# Patient Record
Sex: FEMALE | Race: WHITE | RURAL
[De-identification: names, ages, dates, MRNs, and addresses within clinical notes are randomized per-mention and may not be internally consistent; named-entity substitution may affect disease eponyms.]

---

## 2022-01-26 ENCOUNTER — HOSPITAL ENCOUNTER (OUTPATIENT)
Facility: HOSPITAL | Age: 22
Discharge: HOME | End: 2022-01-26
Payer: MEDICAID

## 2022-01-26 LAB — SARS-COV-2, NAAT: DETECTED

## 2022-01-26 PROCEDURE — 87635 SARS-COV-2 COVID-19 AMP PRB: CPT

## 2022-04-12 ENCOUNTER — HOSPITAL ENCOUNTER (OUTPATIENT)
Facility: HOSPITAL | Age: 22
Discharge: HOME | End: 2022-04-12
Payer: MEDICAID

## 2022-04-12 LAB
RAPID INFLUENZA  B AGN: NEGATIVE
RAPID INFLUENZA A AGN: NEGATIVE

## 2022-04-12 PROCEDURE — 87804 INFLUENZA ASSAY W/OPTIC: CPT

## 2022-07-14 ENCOUNTER — HOSPITAL ENCOUNTER (OUTPATIENT)
Facility: HOSPITAL | Age: 22
Discharge: HOME | End: 2022-07-14
Payer: MEDICAID

## 2022-07-14 LAB
A/G RATIO: 2.2 (ref 0.8–2)
ALBUMIN SERPL-MCNC: 5.3 G/DL (ref 3.4–4.8)
ALP BLD-CCNC: 72 U/L (ref 25–100)
ALT SERPL-CCNC: 10 U/L (ref 4–36)
ANION GAP SERPL CALCULATED.3IONS-SCNC: 11 MMOL/L (ref 3–16)
AST SERPL-CCNC: 19 U/L (ref 8–33)
BASOPHILS ABSOLUTE: 0 K/UL (ref 0–0.1)
BASOPHILS RELATIVE PERCENT: 0.3 %
BILIRUB SERPL-MCNC: 1.4 MG/DL (ref 0.3–1.2)
BUN BLDV-MCNC: 11 MG/DL (ref 6–20)
CALCIUM SERPL-MCNC: 10.5 MG/DL (ref 8.5–10.5)
CHLORIDE BLD-SCNC: 104 MMOL/L (ref 98–107)
CHOLESTEROL, TOTAL: 195 MG/DL (ref 0–200)
CO2: 26 MMOL/L (ref 20–30)
CREAT SERPL-MCNC: 0.8 MG/DL (ref 0.4–1.2)
EOSINOPHILS ABSOLUTE: 0.1 K/UL (ref 0–0.4)
EOSINOPHILS RELATIVE PERCENT: 1.6 %
FOLATE: 13.96 NG/ML
GFR AFRICAN AMERICAN: >59
GFR NON-AFRICAN AMERICAN: >60
GLOBULIN: 2.4 G/DL
GLUCOSE BLD-MCNC: 92 MG/DL (ref 74–106)
HCT VFR BLD CALC: 42.4 % (ref 37–47)
HDLC SERPL-MCNC: 55 MG/DL (ref 40–60)
HEMOGLOBIN: 13.9 G/DL (ref 11.5–16.5)
IMMATURE GRANULOCYTES #: 0 K/UL
IMMATURE GRANULOCYTES %: 0.3 % (ref 0–5)
LDL CHOLESTEROL CALCULATED: 129 MG/DL
LYMPHOCYTES ABSOLUTE: 1.9 K/UL (ref 1.5–4)
LYMPHOCYTES RELATIVE PERCENT: 31.1 %
MCH RBC QN AUTO: 27.9 PG (ref 27–32)
MCHC RBC AUTO-ENTMCNC: 32.8 G/DL (ref 31–35)
MCV RBC AUTO: 85 FL (ref 80–100)
MONOCYTES ABSOLUTE: 0.5 K/UL (ref 0.2–0.8)
MONOCYTES RELATIVE PERCENT: 8 %
NEUTROPHILS ABSOLUTE: 3.6 K/UL (ref 2–7.5)
NEUTROPHILS RELATIVE PERCENT: 58.7 %
PDW BLD-RTO: 13.2 % (ref 11–16)
PLATELET # BLD: 301 K/UL (ref 150–400)
PMV BLD AUTO: 9.9 FL (ref 6–10)
POTASSIUM SERPL-SCNC: 4.7 MMOL/L (ref 3.4–5.1)
RBC # BLD: 4.99 M/UL (ref 3.8–5.8)
SODIUM BLD-SCNC: 141 MMOL/L (ref 136–145)
TOTAL PROTEIN: 7.7 G/DL (ref 6.4–8.3)
TRIGL SERPL-MCNC: 54 MG/DL (ref 0–249)
TSH SERPL DL<=0.05 MIU/L-ACNC: 1.68 UIU/ML (ref 0.27–4.2)
VITAMIN B-12: 437 PG/ML (ref 211–911)
VLDLC SERPL CALC-MCNC: 11 MG/DL
WBC # BLD: 6.2 K/UL (ref 4–11)

## 2022-07-14 PROCEDURE — 80061 LIPID PANEL: CPT

## 2022-07-14 PROCEDURE — 85025 COMPLETE CBC W/AUTO DIFF WBC: CPT

## 2022-07-14 PROCEDURE — 84443 ASSAY THYROID STIM HORMONE: CPT

## 2022-07-14 PROCEDURE — 82746 ASSAY OF FOLIC ACID SERUM: CPT

## 2022-07-14 PROCEDURE — 36415 COLL VENOUS BLD VENIPUNCTURE: CPT

## 2022-07-14 PROCEDURE — 82607 VITAMIN B-12: CPT

## 2022-07-14 PROCEDURE — 80053 COMPREHEN METABOLIC PANEL: CPT

## 2022-11-02 ENCOUNTER — HOSPITAL ENCOUNTER (OUTPATIENT)
Facility: HOSPITAL | Age: 22
Discharge: HOME | End: 2022-11-02
Payer: MEDICAID

## 2022-11-02 ENCOUNTER — HOSPITAL ENCOUNTER (OUTPATIENT)
Dept: GENERAL RADIOLOGY | Facility: HOSPITAL | Age: 22
Discharge: HOME | End: 2022-11-02
Payer: MEDICAID

## 2022-11-02 DIAGNOSIS — Z11.1 SCREENING FOR TUBERCULOSIS: ICD-10-CM

## 2022-11-02 DIAGNOSIS — R61 GENERALIZED HYPERHIDROSIS: ICD-10-CM

## 2022-11-02 LAB
A/G RATIO: 2.6 (ref 0.8–2)
ALBUMIN SERPL-MCNC: 5.2 G/DL (ref 3.4–4.8)
ALP BLD-CCNC: 58 U/L (ref 25–100)
ALT SERPL-CCNC: 9 U/L (ref 4–36)
ANION GAP SERPL CALCULATED.3IONS-SCNC: 11 MMOL/L (ref 3–16)
AST SERPL-CCNC: 16 U/L (ref 8–33)
BASOPHILS ABSOLUTE: 0 K/UL (ref 0–0.1)
BASOPHILS RELATIVE PERCENT: 0.4 %
BILIRUB SERPL-MCNC: 1.6 MG/DL (ref 0.3–1.2)
BUN BLDV-MCNC: 7 MG/DL (ref 6–20)
CALCIUM SERPL-MCNC: 9.6 MG/DL (ref 8.5–10.5)
CHLORIDE BLD-SCNC: 104 MMOL/L (ref 98–107)
CHOLESTEROL, TOTAL: 173 MG/DL (ref 0–200)
CO2: 27 MMOL/L (ref 20–30)
CREAT SERPL-MCNC: 0.7 MG/DL (ref 0.4–1.2)
EOSINOPHILS ABSOLUTE: 0.1 K/UL (ref 0–0.4)
EOSINOPHILS RELATIVE PERCENT: 0.9 %
FERRITIN: 31.9 NG/ML (ref 22–322)
FOLATE: 13.21 NG/ML
FOLLICLE STIMULATING HORMONE: 9.3 MIU/ML
GFR SERPL CREATININE-BSD FRML MDRD: >60 ML/MIN/{1.73_M2}
GLOBULIN: 2 G/DL
GLUCOSE BLD-MCNC: 113 MG/DL (ref 74–106)
HBA1C MFR BLD: 4.9 %
HCT VFR BLD CALC: 41.2 % (ref 37–47)
HDLC SERPL-MCNC: 47 MG/DL (ref 40–60)
HEMOGLOBIN: 13.8 G/DL (ref 11.5–16.5)
IMMATURE GRANULOCYTES #: 0 K/UL
IMMATURE GRANULOCYTES %: 0.4 % (ref 0–5)
IRON: 84 UG/DL (ref 37–145)
LDL CHOLESTEROL CALCULATED: 118 MG/DL
LUTEINIZING HORMONE: 17.6 MIU/ML
LYMPHOCYTES ABSOLUTE: 1.3 K/UL (ref 1.5–4)
LYMPHOCYTES RELATIVE PERCENT: 24.5 %
MAGNESIUM: 2.1 MG/DL (ref 1.7–2.4)
MCH RBC QN AUTO: 28.8 PG (ref 27–32)
MCHC RBC AUTO-ENTMCNC: 33.5 G/DL (ref 31–35)
MCV RBC AUTO: 86 FL (ref 80–100)
MONO TEST: NEGATIVE
MONOCYTES ABSOLUTE: 0.4 K/UL (ref 0.2–0.8)
MONOCYTES RELATIVE PERCENT: 6.6 %
NEUTROPHILS ABSOLUTE: 3.6 K/UL (ref 2–7.5)
NEUTROPHILS RELATIVE PERCENT: 67.2 %
PDW BLD-RTO: 13.1 % (ref 11–16)
PLATELET # BLD: 272 K/UL (ref 150–400)
PMV BLD AUTO: 9.7 FL (ref 6–10)
POTASSIUM SERPL-SCNC: 3.7 MMOL/L (ref 3.4–5.1)
RBC # BLD: 4.79 M/UL (ref 3.8–5.8)
SEDIMENTATION RATE, ERYTHROCYTE: 4 MM/HR (ref 0–20)
SODIUM BLD-SCNC: 142 MMOL/L (ref 136–145)
TOTAL IRON BINDING CAPACITY: 347 UG/DL (ref 250–450)
TOTAL PROTEIN: 7.2 G/DL (ref 6.4–8.3)
TRIGL SERPL-MCNC: 41 MG/DL (ref 0–249)
TSH SERPL DL<=0.05 MIU/L-ACNC: 0.76 UIU/ML (ref 0.27–4.2)
VITAMIN B-12: 344 PG/ML (ref 211–911)
VITAMIN D 25-HYDROXY: 14 (ref 32–100)
VLDLC SERPL CALC-MCNC: 8 MG/DL
WBC # BLD: 5.3 K/UL (ref 4–11)

## 2022-11-02 PROCEDURE — 86664 EPSTEIN-BARR NUCLEAR ANTIGEN: CPT

## 2022-11-02 PROCEDURE — 83001 ASSAY OF GONADOTROPIN (FSH): CPT

## 2022-11-02 PROCEDURE — 82728 ASSAY OF FERRITIN: CPT

## 2022-11-02 PROCEDURE — 82746 ASSAY OF FOLIC ACID SERUM: CPT

## 2022-11-02 PROCEDURE — 83002 ASSAY OF GONADOTROPIN (LH): CPT

## 2022-11-02 PROCEDURE — 86665 EPSTEIN-BARR CAPSID VCA: CPT

## 2022-11-02 PROCEDURE — 86480 TB TEST CELL IMMUN MEASURE: CPT

## 2022-11-02 PROCEDURE — 80061 LIPID PANEL: CPT

## 2022-11-02 PROCEDURE — 85652 RBC SED RATE AUTOMATED: CPT

## 2022-11-02 PROCEDURE — 82607 VITAMIN B-12: CPT

## 2022-11-02 PROCEDURE — 80053 COMPREHEN METABOLIC PANEL: CPT

## 2022-11-02 PROCEDURE — 83036 HEMOGLOBIN GLYCOSYLATED A1C: CPT

## 2022-11-02 PROCEDURE — 83735 ASSAY OF MAGNESIUM: CPT

## 2022-11-02 PROCEDURE — 86308 HETEROPHILE ANTIBODY SCREEN: CPT

## 2022-11-02 PROCEDURE — 84443 ASSAY THYROID STIM HORMONE: CPT

## 2022-11-02 PROCEDURE — 83540 ASSAY OF IRON: CPT

## 2022-11-02 PROCEDURE — 85025 COMPLETE CBC W/AUTO DIFF WBC: CPT

## 2022-11-02 PROCEDURE — 82306 VITAMIN D 25 HYDROXY: CPT

## 2022-11-02 PROCEDURE — 36415 COLL VENOUS BLD VENIPUNCTURE: CPT

## 2022-11-02 PROCEDURE — 83550 IRON BINDING TEST: CPT

## 2022-11-02 PROCEDURE — 86663 EPSTEIN-BARR ANTIBODY: CPT

## 2022-11-02 PROCEDURE — 82671 ASSAY OF ESTROGENS: CPT

## 2022-11-02 PROCEDURE — 71046 X-RAY EXAM CHEST 2 VIEWS: CPT

## 2022-11-06 LAB
QUANTI TB GOLD PLUS: NEGATIVE
QUANTI TB1 MINUS NIL: 0 IU/ML (ref 0–0.34)
QUANTI TB2 MINUS NIL: 0 IU/ML (ref 0–0.34)
QUANTIFERON MITOGEN: >10 IU/ML
QUANTIFERON NIL: 0.01 IU/ML

## 2022-11-09 ENCOUNTER — HOSPITAL ENCOUNTER (OUTPATIENT)
Dept: CT IMAGING | Facility: HOSPITAL | Age: 22
Discharge: HOME | End: 2022-11-09
Payer: MEDICAID

## 2022-11-09 DIAGNOSIS — R19.7 DIARRHEA, UNSPECIFIED TYPE: ICD-10-CM

## 2022-11-09 DIAGNOSIS — R53.83 OTHER FATIGUE: ICD-10-CM

## 2022-11-09 DIAGNOSIS — R50.9 FEVER AND CHILLS: ICD-10-CM

## 2022-11-09 DIAGNOSIS — R10.30 ABDOMINAL PAIN, LOWER: ICD-10-CM

## 2022-11-09 PROCEDURE — 74177 CT ABD & PELVIS W/CONTRAST: CPT

## 2022-11-09 PROCEDURE — 6360000004 HC RX CONTRAST MEDICATION

## 2022-11-09 RX ADMIN — IOPAMIDOL 100 ML: 755 INJECTION, SOLUTION INTRAVENOUS at 12:15

## 2023-06-05 ENCOUNTER — HOSPITAL ENCOUNTER (OUTPATIENT)
Facility: HOSPITAL | Age: 23
Discharge: HOME | End: 2023-06-05
Payer: MEDICAID

## 2023-06-05 LAB
25(OH)D3 SERPL-MCNC: 44.6 NG/ML (ref 32–100)
ALBUMIN SERPL-MCNC: 4.6 G/DL (ref 3.4–4.8)
ALBUMIN/GLOB SERPL: 2.2 {RATIO} (ref 0.8–2)
ALP SERPL-CCNC: 40 U/L (ref 25–100)
ALT SERPL-CCNC: 8 U/L (ref 4–36)
ANION GAP SERPL CALCULATED.3IONS-SCNC: 13 MMOL/L (ref 3–16)
AST SERPL-CCNC: 15 U/L (ref 8–33)
BASOPHILS # BLD: 0 K/UL (ref 0–0.1)
BASOPHILS NFR BLD: 0.3 %
BILIRUB SERPL-MCNC: 1.4 MG/DL (ref 0.3–1.2)
BUN SERPL-MCNC: 9 MG/DL (ref 6–20)
CALCIUM SERPL-MCNC: 9.2 MG/DL (ref 8.5–10.5)
CHLORIDE SERPL-SCNC: 105 MMOL/L (ref 98–107)
CHOLEST SERPL-MCNC: 207 MG/DL (ref 0–200)
CO2 SERPL-SCNC: 21 MMOL/L (ref 20–30)
CREAT SERPL-MCNC: 0.7 MG/DL (ref 0.4–1.2)
EOSINOPHIL # BLD: 0.1 K/UL (ref 0–0.4)
EOSINOPHIL NFR BLD: 1.6 %
ERYTHROCYTE [DISTWIDTH] IN BLOOD BY AUTOMATED COUNT: 13.1 % (ref 11–16)
FOLATE SERPL-MCNC: 11.16 NG/ML
GFR SERPLBLD CREATININE-BSD FMLA CKD-EPI: >60 ML/MIN/{1.73_M2}
GLOBULIN SER CALC-MCNC: 2.1 G/DL
GLUCOSE SERPL-MCNC: 78 MG/DL (ref 74–106)
HBA1C MFR BLD: 5.1 %
HCT VFR BLD AUTO: 41.4 % (ref 37–47)
HDLC SERPL-MCNC: 45 MG/DL (ref 40–60)
HGB BLD-MCNC: 13.5 G/DL (ref 11.5–16.5)
IMM GRANULOCYTES # BLD: 0 K/UL
IMM GRANULOCYTES NFR BLD: 0.3 % (ref 0–5)
LDLC SERPL CALC-MCNC: 145 MG/DL
LYMPHOCYTES # BLD: 1.4 K/UL (ref 1.5–4)
LYMPHOCYTES NFR BLD: 22.4 %
MCH RBC QN AUTO: 28.2 PG (ref 27–32)
MCHC RBC AUTO-ENTMCNC: 32.6 G/DL (ref 31–35)
MCV RBC AUTO: 86.4 FL (ref 80–100)
MONOCYTES # BLD: 0.3 K/UL (ref 0.2–0.8)
MONOCYTES NFR BLD: 5 %
NEUTROPHILS # BLD: 4.5 K/UL (ref 2–7.5)
NEUTS SEG NFR BLD: 70.4 %
PLATELET # BLD AUTO: 277 K/UL (ref 150–400)
PMV BLD AUTO: 10.1 FL (ref 6–10)
POTASSIUM SERPL-SCNC: 4.2 MMOL/L (ref 3.4–5.1)
PROT SERPL-MCNC: 6.7 G/DL (ref 6.4–8.3)
RBC # BLD AUTO: 4.79 M/UL (ref 3.8–5.8)
SODIUM SERPL-SCNC: 139 MMOL/L (ref 136–145)
TRIGL SERPL-MCNC: 83 MG/DL (ref 0–249)
TSH SERPL DL<=0.005 MIU/L-ACNC: 1.37 UIU/ML (ref 0.27–4.2)
VIT B12 SERPL-MCNC: 322 PG/ML (ref 211–911)
VLDLC SERPL CALC-MCNC: 17 MG/DL
WBC # BLD AUTO: 6.4 K/UL (ref 4–11)

## 2023-06-05 PROCEDURE — 85025 COMPLETE CBC W/AUTO DIFF WBC: CPT

## 2023-06-05 PROCEDURE — 82746 ASSAY OF FOLIC ACID SERUM: CPT

## 2023-06-05 PROCEDURE — 83036 HEMOGLOBIN GLYCOSYLATED A1C: CPT

## 2023-06-05 PROCEDURE — 82607 VITAMIN B-12: CPT

## 2023-06-05 PROCEDURE — 84443 ASSAY THYROID STIM HORMONE: CPT

## 2023-06-05 PROCEDURE — 80061 LIPID PANEL: CPT

## 2023-06-05 PROCEDURE — 36415 COLL VENOUS BLD VENIPUNCTURE: CPT

## 2023-06-05 PROCEDURE — 80053 COMPREHEN METABOLIC PANEL: CPT

## 2023-06-05 PROCEDURE — 82306 VITAMIN D 25 HYDROXY: CPT

## 2023-09-13 ENCOUNTER — HOSPITAL ENCOUNTER (OUTPATIENT)
Facility: HOSPITAL | Age: 23
Discharge: HOME | End: 2023-09-13
Payer: MEDICAID

## 2023-09-13 LAB — TRICHOMONAS #/AREA URNS HPF: NORMAL /[HPF]

## 2023-09-13 PROCEDURE — 86702 HIV-2 ANTIBODY: CPT

## 2023-09-13 PROCEDURE — 80074 ACUTE HEPATITIS PANEL: CPT

## 2023-09-13 PROCEDURE — 87491 CHLMYD TRACH DNA AMP PROBE: CPT

## 2023-09-13 PROCEDURE — 87390 HIV-1 AG IA: CPT

## 2023-09-13 PROCEDURE — 86701 HIV-1ANTIBODY: CPT

## 2023-09-13 PROCEDURE — 87591 N.GONORRHOEAE DNA AMP PROB: CPT

## 2023-09-13 PROCEDURE — 36415 COLL VENOUS BLD VENIPUNCTURE: CPT

## 2023-09-13 PROCEDURE — 86780 TREPONEMA PALLIDUM: CPT

## 2023-09-13 PROCEDURE — 81015 MICROSCOPIC EXAM OF URINE: CPT

## 2023-09-14 LAB
HAV IGM SERPL QL IA: NORMAL
HBV CORE IGM SERPL QL IA: NORMAL
HBV SURFACE AG SERPL QL IA: NORMAL
HCV AB SERPL QL IA: NORMAL

## 2023-09-15 LAB
C TRACH DNA UR QL NAA+PROBE: NEGATIVE
HIV 1+2 AB+HIV1 P24 AG SERPL QL IA: NORMAL
HIV 2 AB SERPL QL IA: NORMAL
HIV1 AB SERPL QL IA: NORMAL
HIV1 P24 AG SERPL QL IA: NORMAL
N GONORRHOEA DNA UR QL NAA+PROBE: NEGATIVE
REAGIN+T PALLIDUM IGG+IGM SERPL-IMP: NORMAL

## 2023-12-10 ENCOUNTER — HOSPITAL ENCOUNTER (EMERGENCY)
Facility: HOSPITAL | Age: 23
Discharge: HOME | End: 2023-12-10
Attending: STUDENT IN AN ORGANIZED HEALTH CARE EDUCATION/TRAINING PROGRAM | Admitting: STUDENT IN AN ORGANIZED HEALTH CARE EDUCATION/TRAINING PROGRAM
Payer: COMMERCIAL

## 2023-12-10 VITALS
TEMPERATURE: 97.8 F | HEART RATE: 65 BPM | HEIGHT: 66 IN | SYSTOLIC BLOOD PRESSURE: 120 MMHG | OXYGEN SATURATION: 97 % | BODY MASS INDEX: 17.64 KG/M2 | RESPIRATION RATE: 18 BRPM | WEIGHT: 109.8 LBS | DIASTOLIC BLOOD PRESSURE: 80 MMHG

## 2023-12-10 DIAGNOSIS — S09.90XA INJURY OF HEAD, INITIAL ENCOUNTER: Primary | ICD-10-CM

## 2023-12-10 PROCEDURE — 99282 EMERGENCY DEPT VISIT SF MDM: CPT

## 2023-12-10 NOTE — Clinical Note
[facility] EMERGENCY DEPARTMENT  [address] [address]  Phone: [phone]    Raza Pham was seen and treated in our emergency department on 12/10/2023.  She may return to work on 12/14/2023.         Thank you for choosing [facility].    Mike Vargas MD

## 2023-12-10 NOTE — Clinical Note
[facility] EMERGENCY DEPARTMENT  [address] [address]  Phone: [phone]    Raza Pham was seen and treated in our emergency department on 12/10/2023.  She may return to work on 12/14/2023.         Thank you for choosing [facility].    Mike Vargas MD
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
## 2024-01-26 ENCOUNTER — HOSPITAL ENCOUNTER (EMERGENCY)
Facility: HOSPITAL | Age: 24
Discharge: HOME | End: 2024-01-26
Attending: EMERGENCY MEDICINE
Payer: COMMERCIAL

## 2024-01-26 ENCOUNTER — APPOINTMENT (OUTPATIENT)
Dept: CT IMAGING | Facility: HOSPITAL | Age: 24
End: 2024-01-26
Payer: COMMERCIAL

## 2024-01-26 VITALS
SYSTOLIC BLOOD PRESSURE: 137 MMHG | DIASTOLIC BLOOD PRESSURE: 80 MMHG | HEIGHT: 67 IN | WEIGHT: 110 LBS | HEART RATE: 64 BPM | BODY MASS INDEX: 17.27 KG/M2 | OXYGEN SATURATION: 98 % | RESPIRATION RATE: 16 BRPM | TEMPERATURE: 98.1 F

## 2024-01-26 DIAGNOSIS — R10.9 LEFT FLANK PAIN: Primary | ICD-10-CM

## 2024-01-26 LAB
B-HCG UR QL: NEGATIVE
BILIRUB UR QL STRIP: NEGATIVE
CLARITY UR: CLEAR
COLOR UR: YELLOW
GLUCOSE UR STRIP-MCNC: NEGATIVE MG/DL
HGB UR QL STRIP.AUTO: NEGATIVE
KETONES UR QL STRIP: NEGATIVE
LEUKOCYTE ESTERASE UR QL STRIP.AUTO: NEGATIVE
NITRITE UR QL STRIP: NEGATIVE
PH UR STRIP.AUTO: 7.5 [PH] (ref 5–8)
PROT UR QL STRIP: NEGATIVE
SP GR UR STRIP: <=1.005 (ref 1–1.03)
UROBILINOGEN UR QL STRIP: NORMAL

## 2024-01-26 PROCEDURE — 74176 CT ABD & PELVIS W/O CONTRAST: CPT

## 2024-01-26 PROCEDURE — 81003 URINALYSIS AUTO W/O SCOPE: CPT

## 2024-01-26 PROCEDURE — 81025 URINE PREGNANCY TEST: CPT | Performed by: PHYSICIAN ASSISTANT

## 2024-01-26 PROCEDURE — 99284 EMERGENCY DEPT VISIT MOD MDM: CPT

## 2024-01-26 RX ORDER — IBUPROFEN 600 MG/1
600 TABLET ORAL ONCE
Status: COMPLETED | OUTPATIENT
Start: 2024-01-26 | End: 2024-01-26

## 2024-01-26 RX ORDER — SODIUM CHLORIDE 0.9 % (FLUSH) 0.9 %
10 SYRINGE (ML) INJECTION AS NEEDED
Status: DISCONTINUED | OUTPATIENT
Start: 2024-01-26 | End: 2024-01-26

## 2024-01-26 RX ADMIN — IBUPROFEN 600 MG: 600 TABLET ORAL at 18:17

## 2024-01-26 NOTE — Clinical Note
[facility] EMERGENCY DEPARTMENT  [address] [facility] [address]  Phone: [phone]    Raza Pham was seen and treated in our emergency department on 1/26/2024.  She may return to work on 01/27/2024.         Thank you for choosing [facility].    Kolton Navarrete, DO

## 2024-01-26 NOTE — ED PROVIDER NOTES
"Subjective  History of Present Illness:    Chief Complaint:   Chief Complaint   Patient presents with    Flank Pain      History of Present Illness: Raza Pham is a 23 y.o. female who presents to the emergency department complaining of left leg pain and dysuria.  Patient states back in November 23 had a UTI with significant hematuria.  Was treated with antibiotics and symptoms improved.  Yesterday had acute onset of dysuria left low back pain.  Mild nausea earlier, no diarrhea.  Onset: Today  Duration: Ongoing  Exacerbating / Alleviating factors: States holding pressure on her left low back helps relieve the pain  Associated symptoms: None      Nurses Notes reviewed and agree, including vitals, allergies, social history and prior medical history.     Review of Systems   Constitutional: Negative.    HENT: Negative.     Eyes: Negative.    Respiratory: Negative.     Cardiovascular: Negative.    Gastrointestinal: Negative.    Genitourinary:  Positive for dysuria and flank pain.   Skin: Negative.    Neurological: Negative.    Psychiatric/Behavioral: Negative.     All other systems reviewed and are negative.      History reviewed. No pertinent past medical history.    Allergies:    Patient has no known allergies.      History reviewed. No pertinent surgical history.      Social History     Socioeconomic History    Marital status: Single         History reviewed. No pertinent family history.    Objective  Physical Exam:  /89 (BP Location: Left arm, Patient Position: Sitting)   Pulse 68   Temp 98.4 °F (36.9 °C) (Oral)   Resp 17   Ht 170.2 cm (67\")   Wt 49.9 kg (110 lb)   LMP 01/10/2024 (Approximate)   SpO2 100%   BMI 17.23 kg/m²      Physical Exam  Vitals reviewed.   Constitutional:       General: She is not in acute distress.     Appearance: Normal appearance. She is normal weight. She is not ill-appearing, toxic-appearing or diaphoretic.   HENT:      Head: Normocephalic and atraumatic.      Nose: Nose " normal.   Eyes:      Extraocular Movements: Extraocular movements intact.   Cardiovascular:      Rate and Rhythm: Normal rate.   Pulmonary:      Effort: Pulmonary effort is normal.   Abdominal:      General: Abdomen is flat. Bowel sounds are normal.      Palpations: Abdomen is soft.      Tenderness: There is no abdominal tenderness. There is no right CVA tenderness, left CVA tenderness, guarding or rebound.   Musculoskeletal:         General: Normal range of motion.      Cervical back: Normal range of motion.   Skin:     General: Skin is warm and dry.   Neurological:      General: No focal deficit present.      Mental Status: She is alert. Mental status is at baseline.   Psychiatric:         Mood and Affect: Mood normal.         Behavior: Behavior normal.           Procedures    ED Course:         Lab Results (last 24 hours)       Procedure Component Value Units Date/Time    Urinalysis With Microscopic If Indicated (No Culture) - Urine, Clean Catch [[id number]]  (Normal) Collected: 01/26/24 1639    Specimen: Urine, Clean Catch Updated: 01/26/24 1648     Color, UA Yellow     Appearance, UA Clear     pH, UA 7.5     Specific Gravity, UA <=1.005     Glucose, UA Negative     Ketones, UA Negative     Bilirubin, UA Negative     Blood, UA Negative     Protein, UA Negative     Leuk Esterase, UA Negative     Nitrite, UA Negative     Urobilinogen, UA 0.2 E.U./dL    Narrative:      Urine microscopic not indicated.    Pregnancy, Urine - Urine, Clean Catch [[id number]]  (Normal) Collected: 01/26/24 1639    Specimen: Urine, Clean Catch Updated: 01/26/24 1659     HCG, Urine QL Negative             No radiology results from the last 24 hrs                          Medical Decision Making  Amount and/or Complexity of Data Reviewed  Radiology: ordered.    Risk  Prescription drug management.              Raza Pham is a 23 y.o. female who presents to the emergency department for evaluation of dysuria and left flank pain    Differential  diagnosis includes ureteral stone, pyelonephritis, UTI among other etiologies.    Urinalysis and CT scan ordered for further evaluation of the patient's presentation.    Chart review if available included outside testing, previous visits, prior labs, prior imaging, available notes from prior evaluations or visits with specialists, medication list, allergies, past medical history, past surgical history when applicable.    Patient was treated with   Medications   ibuprofen (ADVIL,MOTRIN) tablet 600 mg (600 mg Oral Given 1/26/24 1817)       Patient refused IV and blood work, wishing only for urinalysis and CT scan at this time.    1852  Urinalysis shows no signs of hematuria or UTI.  CT scan unremarkable.  Given the fact patient had dysuria once prior to arrival and then urinated here without pain suspect she could have recently passed a kidney stone.  She is in no acute distress nontoxic will follow-up outpatient.    Plan for disposition is discharged home.  Patient/family comfortable with and understanding of the plan.      Final diagnoses:   Left flank pain          Cory Reza PA-C  01/26/24 1852       Cory Reza PA-C  01/26/24 1853

## 2024-02-21 ENCOUNTER — HOSPITAL ENCOUNTER (OUTPATIENT)
Facility: HOSPITAL | Age: 24
Discharge: HOME | End: 2024-02-21
Payer: MEDICAID

## 2024-02-21 LAB
FLUAV AG NPH QL: NEGATIVE
FLUBV AG NPH QL: NEGATIVE
SARS-COV-2 RDRP RESP QL NAA+PROBE: NOT DETECTED

## 2024-02-21 PROCEDURE — 87804 INFLUENZA ASSAY W/OPTIC: CPT

## 2024-02-21 PROCEDURE — 87635 SARS-COV-2 COVID-19 AMP PRB: CPT

## 2024-03-13 ENCOUNTER — HOSPITAL ENCOUNTER (OUTPATIENT)
Facility: HOSPITAL | Age: 24
Discharge: HOME | End: 2024-03-13
Payer: MEDICAID

## 2024-03-13 ENCOUNTER — TRANSCRIBE ORDERS (OUTPATIENT)
Dept: ADMINISTRATIVE | Age: 24
End: 2024-03-13

## 2024-03-13 ENCOUNTER — HOSPITAL ENCOUNTER (OUTPATIENT)
Dept: ULTRASOUND IMAGING | Facility: HOSPITAL | Age: 24
Discharge: HOME | End: 2024-03-13
Payer: MEDICAID

## 2024-03-13 DIAGNOSIS — R10.9 ACUTE ABDOMINAL PAIN: ICD-10-CM

## 2024-03-13 DIAGNOSIS — R10.9 STOMACH ACHE: ICD-10-CM

## 2024-03-13 LAB
25(OH)D3 SERPL-MCNC: 27 NG/ML (ref 32–100)
ALBUMIN SERPL-MCNC: 4.4 G/DL (ref 3.4–4.8)
ALBUMIN/GLOB SERPL: 1.8 {RATIO} (ref 0.8–2)
ALP SERPL-CCNC: 43 U/L (ref 25–100)
ALT SERPL-CCNC: 12 U/L (ref 4–36)
ANION GAP SERPL CALCULATED.3IONS-SCNC: 9 MMOL/L (ref 3–16)
AST SERPL-CCNC: 17 U/L (ref 8–33)
BASOPHILS # BLD: 0 K/UL (ref 0–0.1)
BASOPHILS NFR BLD: 0.4 %
BILIRUB SERPL-MCNC: 0.9 MG/DL (ref 0.3–1.2)
BUN SERPL-MCNC: 11 MG/DL (ref 6–20)
CALCIUM SERPL-MCNC: 9.4 MG/DL (ref 8.5–10.5)
CHLORIDE SERPL-SCNC: 105 MMOL/L (ref 98–107)
CHOLEST SERPL-MCNC: 179 MG/DL (ref 0–200)
CO2 SERPL-SCNC: 24 MMOL/L (ref 20–30)
CREAT SERPL-MCNC: 0.7 MG/DL (ref 0.4–1.2)
EOSINOPHIL # BLD: 0.1 K/UL (ref 0–0.4)
EOSINOPHIL NFR BLD: 2.4 %
ERYTHROCYTE [DISTWIDTH] IN BLOOD BY AUTOMATED COUNT: 13.3 % (ref 11–16)
FOLATE SERPL-MCNC: 10.96 NG/ML
GFR SERPLBLD CREATININE-BSD FMLA CKD-EPI: >60 ML/MIN/{1.73_M2}
GLOBULIN SER CALC-MCNC: 2.4 G/DL
GLUCOSE SERPL-MCNC: 91 MG/DL (ref 74–106)
HBA1C MFR BLD: 5.1 %
HCT VFR BLD AUTO: 39.9 % (ref 37–47)
HDLC SERPL-MCNC: 54 MG/DL (ref 40–60)
HGB BLD-MCNC: 13.1 G/DL (ref 11.5–16.5)
IMM GRANULOCYTES # BLD: 0 K/UL
IMM GRANULOCYTES NFR BLD: 0.4 % (ref 0–5)
LDLC SERPL CALC-MCNC: 113 MG/DL
LYMPHOCYTES # BLD: 1.7 K/UL (ref 1.5–4)
LYMPHOCYTES NFR BLD: 30.4 %
MCH RBC QN AUTO: 28.5 PG (ref 27–32)
MCHC RBC AUTO-ENTMCNC: 32.8 G/DL (ref 31–35)
MCV RBC AUTO: 86.9 FL (ref 80–100)
MONOCYTES # BLD: 0.4 K/UL (ref 0.2–0.8)
MONOCYTES NFR BLD: 6.6 %
NEUTROPHILS # BLD: 3.3 K/UL (ref 2–7.5)
NEUTS SEG NFR BLD: 59.8 %
PLATELET # BLD AUTO: 272 K/UL (ref 150–400)
PMV BLD AUTO: 10.3 FL (ref 6–10)
POTASSIUM SERPL-SCNC: 4.2 MMOL/L (ref 3.4–5.1)
PROT SERPL-MCNC: 6.8 G/DL (ref 6.4–8.3)
RBC # BLD AUTO: 4.59 M/UL (ref 3.8–5.8)
SODIUM SERPL-SCNC: 138 MMOL/L (ref 136–145)
TRIGL SERPL-MCNC: 62 MG/DL (ref 0–249)
TSH SERPL DL<=0.005 MIU/L-ACNC: 1.97 UIU/ML (ref 0.27–4.2)
VIT B12 SERPL-MCNC: 325 PG/ML (ref 211–911)
VLDLC SERPL CALC-MCNC: 12 MG/DL
WBC # BLD AUTO: 5.5 K/UL (ref 4–11)

## 2024-03-13 PROCEDURE — 76700 US EXAM ABDOM COMPLETE: CPT

## 2024-03-13 PROCEDURE — 84443 ASSAY THYROID STIM HORMONE: CPT

## 2024-03-13 PROCEDURE — 76830 TRANSVAGINAL US NON-OB: CPT

## 2024-03-13 PROCEDURE — 83036 HEMOGLOBIN GLYCOSYLATED A1C: CPT

## 2024-03-13 PROCEDURE — 82306 VITAMIN D 25 HYDROXY: CPT

## 2024-03-13 PROCEDURE — 82746 ASSAY OF FOLIC ACID SERUM: CPT

## 2024-03-13 PROCEDURE — 82607 VITAMIN B-12: CPT

## 2024-03-13 PROCEDURE — 85025 COMPLETE CBC W/AUTO DIFF WBC: CPT

## 2024-03-13 PROCEDURE — 80053 COMPREHEN METABOLIC PANEL: CPT

## 2024-03-13 PROCEDURE — 80061 LIPID PANEL: CPT

## 2024-03-23 ENCOUNTER — OFFICE VISIT (OUTPATIENT)
Dept: PRIMARY CARE CLINIC | Age: 24
End: 2024-03-23
Payer: MEDICAID

## 2024-03-23 VITALS
SYSTOLIC BLOOD PRESSURE: 109 MMHG | TEMPERATURE: 98.8 F | OXYGEN SATURATION: 97 % | DIASTOLIC BLOOD PRESSURE: 74 MMHG | HEIGHT: 67 IN | HEART RATE: 88 BPM | BODY MASS INDEX: 17.42 KG/M2 | WEIGHT: 111 LBS

## 2024-03-23 DIAGNOSIS — J02.9 ACUTE PHARYNGITIS, UNSPECIFIED ETIOLOGY: Primary | ICD-10-CM

## 2024-03-23 PROCEDURE — G8427 DOCREV CUR MEDS BY ELIG CLIN: HCPCS | Performed by: NURSE PRACTITIONER

## 2024-03-23 PROCEDURE — G8419 CALC BMI OUT NRM PARAM NOF/U: HCPCS | Performed by: NURSE PRACTITIONER

## 2024-03-23 PROCEDURE — 99213 OFFICE O/P EST LOW 20 MIN: CPT | Performed by: NURSE PRACTITIONER

## 2024-03-23 PROCEDURE — 4004F PT TOBACCO SCREEN RCVD TLK: CPT | Performed by: NURSE PRACTITIONER

## 2024-03-23 PROCEDURE — G8484 FLU IMMUNIZE NO ADMIN: HCPCS | Performed by: NURSE PRACTITIONER

## 2024-03-23 RX ORDER — CEFDINIR 300 MG/1
300 CAPSULE ORAL 2 TIMES DAILY
Qty: 20 CAPSULE | Refills: 0 | Status: SHIPPED | OUTPATIENT
Start: 2024-03-23 | End: 2024-04-02

## 2024-03-23 RX ORDER — FLUCONAZOLE 150 MG/1
150 TABLET ORAL
Qty: 2 TABLET | Refills: 0 | Status: SHIPPED | OUTPATIENT
Start: 2024-03-23 | End: 2024-03-29

## 2024-03-23 RX ORDER — KETOROLAC TROMETHAMINE 10 MG/1
10 TABLET, FILM COATED ORAL EVERY 6 HOURS PRN
COMMUNITY

## 2024-03-23 RX ORDER — NORELGESTROMIN AND ETHINYL ESTRADIOL 35; 150 UG/MG; UG/MG
1 PATCH TRANSDERMAL WEEKLY
COMMUNITY

## 2024-03-23 ASSESSMENT — PATIENT HEALTH QUESTIONNAIRE - PHQ9
SUM OF ALL RESPONSES TO PHQ QUESTIONS 1-9: 0
SUM OF ALL RESPONSES TO PHQ QUESTIONS 1-9: 0
1. LITTLE INTEREST OR PLEASURE IN DOING THINGS: NOT AT ALL
SUM OF ALL RESPONSES TO PHQ9 QUESTIONS 1 & 2: 0
SUM OF ALL RESPONSES TO PHQ QUESTIONS 1-9: 0
SUM OF ALL RESPONSES TO PHQ QUESTIONS 1-9: 0
2. FEELING DOWN, DEPRESSED OR HOPELESS: NOT AT ALL

## 2024-03-23 ASSESSMENT — ENCOUNTER SYMPTOMS
SORE THROAT: 1
EYES NEGATIVE: 1
ABDOMINAL PAIN: 1
RESPIRATORY NEGATIVE: 1

## 2024-03-23 NOTE — PROGRESS NOTES
SUBJECTIVE:    Patient ID: Rj Morales is a 23 y.o.female.    Chief Complaint   Patient presents with    Pharyngitis       HPI:  22 yo W F here for complaints of sore throat since yesterday.  She had strep a month ago and was given amoxicillin but did not take it all so she took 2 tabs last night.  She had a fever at home.  Complains of belly pain.      Patient's medications, allergies, past medical, surgical, social and family histories were reviewed and updated as appropriate in electronic medical record.      Current Outpatient Medications on File Prior to Visit   Medication Sig Dispense Refill    sertraline (ZOLOFT) 50 MG tablet Take 1 tablet by mouth daily      norelgestromin-ethinyl estradiol (XULANE) 150-35 MCG/24HR Place 1 patch onto the skin once a week      ketorolac (TORADOL) 10 MG tablet Take 1 tablet by mouth every 6 hours as needed for Pain       No current facility-administered medications on file prior to visit.        Review of Systems   Constitutional:  Positive for fever.   HENT:  Positive for sore throat.    Eyes: Negative.    Respiratory: Negative.     Cardiovascular: Negative.    Gastrointestinal:  Positive for abdominal pain.   Endocrine: Negative.    Genitourinary: Negative.    Musculoskeletal: Negative.    Neurological: Negative.    Hematological: Negative.    Psychiatric/Behavioral: Negative.         Past Medical History:   Diagnosis Date    Anxiety     Depression      Past Surgical History:   Procedure Laterality Date    MOUTH SURGERY       Family History   Problem Relation Age of Onset    Heart Disease Mother     No Known Problems Father       Social History     Tobacco Use   Smoking Status Never   Smokeless Tobacco Never       OBJECTIVE:   Wt Readings from Last 3 Encounters:   03/23/24 50.3 kg (111 lb)     BP Readings from Last 3 Encounters:   03/23/24 109/74       /74 (Site: Left Upper Arm, Position: Sitting)   Pulse 88   Temp 98.8 °F (37.1 °C) (Oral)   Ht 1.702 m (5'

## 2024-03-25 ENCOUNTER — HOSPITAL ENCOUNTER (OUTPATIENT)
Facility: HOSPITAL | Age: 24
Setting detail: INFUSION SERIES
Discharge: HOME | End: 2024-03-27
Payer: MEDICAID

## 2024-03-25 PROCEDURE — 96372 THER/PROPH/DIAG INJ SC/IM: CPT

## 2024-03-25 PROCEDURE — 6360000002 HC RX W HCPCS

## 2024-03-25 PROCEDURE — 2500000003 HC RX 250 WO HCPCS

## 2024-03-25 RX ORDER — LIDOCAINE HYDROCHLORIDE 10 MG/ML
INJECTION, SOLUTION INFILTRATION; PERINEURAL
Status: DISPENSED
Start: 2024-03-25 | End: 2024-03-26

## 2024-03-25 RX ORDER — CEFTRIAXONE 1 G/1
INJECTION, POWDER, FOR SOLUTION INTRAMUSCULAR; INTRAVENOUS
Status: DISPENSED
Start: 2024-03-25 | End: 2024-03-26

## 2024-03-25 RX ADMIN — LIDOCAINE HYDROCHLORIDE 1000 MG: 10 INJECTION, SOLUTION INFILTRATION; PERINEURAL at 17:30

## 2024-08-24 ENCOUNTER — HOSPITAL ENCOUNTER (EMERGENCY)
Facility: HOSPITAL | Age: 24
Discharge: HOME | End: 2024-08-24
Attending: STUDENT IN AN ORGANIZED HEALTH CARE EDUCATION/TRAINING PROGRAM
Payer: MEDICAID

## 2024-08-24 VITALS
TEMPERATURE: 98.4 F | DIASTOLIC BLOOD PRESSURE: 85 MMHG | SYSTOLIC BLOOD PRESSURE: 119 MMHG | OXYGEN SATURATION: 99 % | WEIGHT: 110 LBS | HEIGHT: 67 IN | HEART RATE: 58 BPM | BODY MASS INDEX: 17.27 KG/M2 | RESPIRATION RATE: 18 BRPM

## 2024-08-24 DIAGNOSIS — R10.32 LEFT LOWER QUADRANT ABDOMINAL PAIN: Primary | ICD-10-CM

## 2024-08-24 PROCEDURE — 99282 EMERGENCY DEPT VISIT SF MDM: CPT

## 2024-08-24 ASSESSMENT — PAIN SCALES - GENERAL: PAINLEVEL_OUTOF10: 6

## 2024-08-24 ASSESSMENT — LIFESTYLE VARIABLES
HOW MANY STANDARD DRINKS CONTAINING ALCOHOL DO YOU HAVE ON A TYPICAL DAY: PATIENT DOES NOT DRINK
HOW OFTEN DO YOU HAVE A DRINK CONTAINING ALCOHOL: NEVER

## 2024-08-24 ASSESSMENT — PAIN DESCRIPTION - LOCATION: LOCATION: ABDOMEN

## 2024-08-24 ASSESSMENT — PAIN DESCRIPTION - ORIENTATION: ORIENTATION: LEFT;LOWER

## 2024-08-24 ASSESSMENT — PAIN DESCRIPTION - DESCRIPTORS: DESCRIPTORS: SHARP

## 2024-08-25 NOTE — ED PROVIDER NOTES
[facility]ERG[facility]Y DEPART[facility]  EMERGENCY DEPARTMENT ENCOUNTER      Pt Name: Rj Morales  MRN: [id number]  Birthdate 2000  Date of evaluation: 8/24/2024  Provider: CASA ESTRADA MD     CHIEF COMPLAINT       Chief Complaint   Patient presents with    Abdominal Pain         HISTORY OF PRESENT ILLNESS   (Location/Symptom, Timing/Onset, Context/Setting, Quality, Duration, Modifying Factors, Severity) Note limiting factors.   I wore appropriate PPE for the entirety of this encounter.      HPI    Rj Morales is a 23 y.o. female who presents to the emergency department evaluation of abdominal pain, left lower quadrant, nonradiating and severe earlier tonight that has now resolved.  States that this is been going on episodically since November of last year.  States that she has been following with GI and had multiple evaluations including colonoscopy, EGD, CT abdomen/pelvis, transvaginal ultrasounds with no explanation to her symptoms and was diagnosed with IBS.  Patient became tearful when she stated that she did not believe this was related to IBS but nothing has seemed to show up on imaging or laboratory evaluation.  States that her symptoms are completely unchanged from baseline right now, states that her pain is improved and almost resolved on its own tonight, denies fever/chills, chest pain, back pain, nausea, vomiting, dysuria or hematuria or other associated symptoms.  States her last menstrual period ended a week ago and was normal.  No vaginal bleeding or discharge at this time.  I discussed the options for evaluation although her symptoms are improved if not resolved and were consistent with what she has been following with GI for and patient stated that she would not like to pursue further evaluation at this time as the symptoms are unchanged she just does not know what to do when she has a severe pain come on.        Nursing Notes were reviewed.  Limitations to history:  Outside

## 2024-08-25 NOTE — ED TRIAGE NOTES
Patient presents today with LLQ abdominal pain for over one year. She has seen GI and had a colonoscopy and EGD in May 2024. She was diagnosed with IBS and chronic gastritis. She was given some medication earlier but that caused constipation, and she stopped. She denies any change in bowel habits at this time. She denies rectal bleeding, fever, nausea and vomiting. She reports that her pain is 6-7/10, but was 10/10 and the pain is sharp. The patient had a UTI about one month ago. She did not finish all antibiotics, but reports this pain is different.

## 2024-08-25 NOTE — ED NOTES
Dc instructions given to patient at this time, patient states pain is there but improved, patient doesn't want any labs done and will follow up with pcp/and possible ob/gyn. Patient with no other questions or concerns.

## 2024-08-25 NOTE — DISCHARGE INSTRUCTIONS
Return to the ED if you have further symptoms you find concerning for emergent illness or injury otherwise please follow-up with your primary doctor in the next 3 to 5 days and as we discussed please consider seeking further psychiatric and mental health evaluation and care.

## 2024-10-16 ENCOUNTER — OFFICE VISIT (OUTPATIENT)
Dept: OBSTETRICS AND GYNECOLOGY | Facility: CLINIC | Age: 24
End: 2024-10-16
Payer: COMMERCIAL

## 2024-10-16 VITALS
HEIGHT: 67 IN | DIASTOLIC BLOOD PRESSURE: 60 MMHG | BODY MASS INDEX: 17.33 KG/M2 | WEIGHT: 110.4 LBS | SYSTOLIC BLOOD PRESSURE: 100 MMHG

## 2024-10-16 DIAGNOSIS — R10.32 LLQ ABDOMINAL PAIN: Primary | ICD-10-CM

## 2024-10-16 RX ORDER — NORELGESTROMIN AND ETHINYL ESTRADIOL 150; 35 UG/D; UG/D
PATCH TRANSDERMAL
COMMUNITY

## 2024-10-16 NOTE — PROGRESS NOTES
Subjective   Chief Complaint   Patient presents with    Abdominal Pain     New Patient here for evaluation of lower abdominal pain that started in November.     Raza Pham is a 23 y.o. year old G0.  No LMP recorded (lmp unknown).  She presents to be seen because of pelvic pain started last November, increased in intensity since the new year.  Pain is midepigastric to the left.  It is persistent at times severe.  Menses are relatively regular with the patch.  Pain does not seem to be significantly exacerbated whilst on menses.  Patient relates normal bowel and bladder habits-had a upper and lower scope in May that was negative per her.  She states GI told her she had IBS but unsure what to make of this.  CT scan reviewed noted in chart.  Ultrasounds reviewed noted in chart.  No dyspareunia    OTHER COMPLAINTS:  Xulane for 2+ yearsago    The following portions of the patient's history were reviewed and updated as appropriate:She  has a past medical history of Anxiety, Asthma, Depression, and Migraine.  She does not have a problem list on file.  She  has a past surgical history that includes [place] tooth extraction.  Her family history includes Breast cancer in her maternal grandmother; Diabetes in her maternal grandfather and maternal grandmother.  She  reports that she has quit smoking. Her smoking use included cigarettes. She has a 1 pack-year smoking history. She does not have any smokeless tobacco history on file. She reports that she does not drink alcohol and does not use drugs.  Current Outpatient Medications   Medication Sig Dispense Refill    cholecalciferol (VITAMIN D3) 1.25 MG (18411 UT) capsule       sertraline (ZOLOFT) 50 MG tablet Take 1 tablet by mouth Daily.      Xulane 150-35 MCG/24HR APPLY 1 PATCH TOPICALLY TO THE SKIN 1 TIME A WEEK       No current facility-administered medications for this visit.     Current Outpatient Medications on File Prior to Visit   Medication Sig    cholecalciferol (VITAMIN  "D3) 1.25 MG (72408 UT) capsule     sertraline (ZOLOFT) 50 MG tablet Take 1 tablet by mouth Daily.    Xulane 150-35 MCG/24HR APPLY 1 PATCH TOPICALLY TO THE SKIN 1 TIME A WEEK     No current facility-administered medications on file prior to visit.     She has No Known Allergies.    Social History    Tobacco Use      Smoking status: Former        Packs/day: 0.50        Years: 0.5 packs/day for 2.0 years (1.0 ttl pk-yrs)        Types: Cigarettes      Smokeless tobacco: Not on file      Tobacco comments: I currently vape daily    Review of Systems  Consitutional POS: nothing reported    NEG: anorexia or night sweats   Gastointestinal POS: see HPI    NEG: bloating, change in bowel habits, melena, or reflux symptoms   Genitourinary POS: nothing reported    NEG: dysuria or hematuria   Integument POS: nothing reported    NEG: moles that are changing in size, shape, color or rashes   Breast POS: nothing reported    NEG: persistent breast lump, skin dimpling, or nipple discharge         Respiratory: negative  Cardiovascular: negative  GYN:  negative          Objective   /60   Ht 170.2 cm (67\")   Wt 50.1 kg (110 lb 6.4 oz)   LMP  (LMP Unknown)   BMI 17.29 kg/m²     General:  well developed; well nourished  no acute distress  mentation appropriate   Skin:  No suspicious lesions seen   Thyroid: normal to inspection and palpation   Lungs:  breathing is unlabored  clear to auscultation bilaterally   Heart:  regular rate and rhythm, S1, S2 normal, no murmur, click, rub or gallop   Breasts:  Not performed.   Abdomen: soft, non-tender; no masses  no umbilical or inguinal hernias are present  no hepato-splenomegaly  Mild tenderness midepigastric region left of the umbilicus.  There is no palpable mass or hernia.   Pelvis: Not performed.     Psychiatric: Alert and oriented ×3, mood and affect appropriate  HEENT: Atraumatic, normocephalic, normal scleral icterus  Extremities: 2+ pulses bilaterally, no edema      Lab Review "   CBC    Imaging   CT of abdomen/pelvis report   Ultrasound done today shows an anteverted uterus normal in size and shape.  Endometrium thin 3.3 mm.  Normal adnexa with small follicles.  No masses.  No free fluid     Assessment   Left mid gastric pain.  This is really a little bit too high to be considered gynecologic.  Unsure what to make of it is patient's had a pretty comprehensive exam and it has been ongoing and significant for the past year.     Plan   Patient has a follow-up second opinion with another gastroenterologist in a week have her follow-up with me after that.  I did state that the only neck step we could do would be to consider diagnostic laparoscopy to rule out endometriosis.      No orders of the defined types were placed in this encounter.         This note was electronically signed.      October 16, 2024

## 2024-10-23 ENCOUNTER — OFFICE VISIT (OUTPATIENT)
Dept: GASTROENTEROLOGY | Facility: CLINIC | Age: 24
End: 2024-10-23
Payer: COMMERCIAL

## 2024-10-23 ENCOUNTER — LAB (OUTPATIENT)
Dept: LAB | Facility: HOSPITAL | Age: 24
End: 2024-10-23
Payer: COMMERCIAL

## 2024-10-23 VITALS
WEIGHT: 114.2 LBS | DIASTOLIC BLOOD PRESSURE: 70 MMHG | HEART RATE: 66 BPM | OXYGEN SATURATION: 97 % | BODY MASS INDEX: 17.89 KG/M2 | SYSTOLIC BLOOD PRESSURE: 110 MMHG

## 2024-10-23 DIAGNOSIS — R10.13 DYSPEPSIA: ICD-10-CM

## 2024-10-23 DIAGNOSIS — R10.33 PERIUMBILICAL ABDOMINAL PAIN: ICD-10-CM

## 2024-10-23 DIAGNOSIS — R63.4 WEIGHT LOSS: ICD-10-CM

## 2024-10-23 DIAGNOSIS — R10.13 DYSPEPSIA: Primary | ICD-10-CM

## 2024-10-23 PROCEDURE — 86003 ALLG SPEC IGE CRUDE XTRC EA: CPT

## 2024-10-23 PROCEDURE — 82784 ASSAY IGA/IGD/IGG/IGM EACH: CPT | Performed by: INTERNAL MEDICINE

## 2024-10-23 PROCEDURE — 86671 FUNGUS NES ANTIBODY: CPT

## 2024-10-23 PROCEDURE — 86231 EMA EACH IG CLASS: CPT | Performed by: INTERNAL MEDICINE

## 2024-10-23 PROCEDURE — 86037 ANCA TITER EACH ANTIBODY: CPT

## 2024-10-23 PROCEDURE — 86258 DGP ANTIBODY EACH IG CLASS: CPT | Performed by: INTERNAL MEDICINE

## 2024-10-23 PROCEDURE — 86008 ALLG SPEC IGE RECOMB EA: CPT

## 2024-10-23 PROCEDURE — 36415 COLL VENOUS BLD VENIPUNCTURE: CPT | Performed by: INTERNAL MEDICINE

## 2024-10-23 PROCEDURE — 86364 TISS TRNSGLTMNASE EA IG CLAS: CPT | Performed by: INTERNAL MEDICINE

## 2024-10-23 PROCEDURE — 82785 ASSAY OF IGE: CPT

## 2024-10-23 RX ORDER — LEVOCETIRIZINE DIHYDROCHLORIDE 5 MG/1
5 TABLET, FILM COATED ORAL EVERY EVENING
COMMUNITY

## 2024-10-23 NOTE — PROGRESS NOTES
New Patient Consult      Date: 10/23/2024   Patient Name: Raza Pham  MRN: [id number]  : 2000     Referring Physician: Referring, Self    Chief Complaint   Patient presents with    Irritable Bowel Syndrome       History of Present Illness: Raza Pham is a 23 y.o. female who is here today to establish care with Gastroenterology for abdominal pain.    She has had extensive workup done by GI in the past.  Has had upper and lower scopes, which showed a normal terminal ileum and colonic mucosa.  Mild gastritis was identified.  Otherwise unremarkable.    Has had a CT scan which does not reveal any obvious pathology.  Has also had abdominal ultrasound does not reveal any obvious pathology.    The pain is located in the mid abdomen.  Just inferior to the umbilicus.  No obvious triggers, but sometimes moving can precipitate the pain.  The pain is followed by bloating.  Once the pain and bloating go away, she feels better in between symptoms.  Does not really have any very significant bowel abnormalities but she will have some loose stools on occasion.    She has tried and failed antispasmodics, Xifaxan.    Increased neck/back pain, sore muscles, abdominal pain, etc. Bruising easily.      Subjective      Past Medical History:   Diagnosis Date    Anxiety     Asthma     Depression     Migraine        Past Surgical History:   Procedure Laterality Date    WISDOM TOOTH EXTRACTION         Family History   Problem Relation Age of Onset    Diabetes Maternal Grandfather     Breast cancer Maternal Grandmother     Diabetes Maternal Grandmother        Social History     Socioeconomic History    Marital status: Single   Tobacco Use    Smoking status: Former     Current packs/day: 0.50     Average packs/day: 0.5 packs/day for 2.0 years (1.0 ttl pk-yrs)     Types: Cigarettes    Tobacco comments:     I currently vape daily   Vaping Use    Vaping status: Never Used   Substance and Sexual Activity    Alcohol use: Never    Drug  use: Never    Sexual activity: Yes     Partners: Male     Birth control/protection: Patch         Current Outpatient Medications:     cholecalciferol (VITAMIN D3) 1.25 MG (74859 UT) capsule, , Disp: , Rfl:     levocetirizine (XYZAL) 5 MG tablet, Take 1 tablet by mouth Every Evening., Disp: , Rfl:     sertraline (ZOLOFT) 50 MG tablet, Take 1 tablet by mouth Daily., Disp: , Rfl:     Xulane 150-35 MCG/24HR, APPLY 1 PATCH TOPICALLY TO THE SKIN 1 TIME A WEEK, Disp: , Rfl:     No Known Allergies    Review of Systems   Constitutional:  Negative for unexpected weight loss.   HENT:  Negative for trouble swallowing.    Gastrointestinal:  Positive for abdominal distention, abdominal pain, nausea, vomiting, GERD and indigestion. Negative for anal bleeding, blood in stool, constipation, diarrhea and rectal pain.       The following portions of the patient's history were reviewed and updated as appropriate: allergies, current medications, past family history, past medical history, past social history, past surgical history and problem list.    Objective     Physical Exam:  Vitals:    10/23/24 1500   BP: 110/70   Pulse: 66   SpO2: 97%   Weight: 51.8 kg (114 lb 3.2 oz)       Physical Exam    Results Review:   I have reviewed the patient's new clinical and imaging results and agree with the interpretation.     No visits with results within 90 Day(s) from this visit.   Latest known visit with results is:   Admission on 01/26/2024, Discharged on 01/26/2024   Component Date Value Ref Range Status    Color, UA 01/26/2024 Yellow  Yellow, Straw Final    Appearance, UA 01/26/2024 Clear  Clear Final    pH, UA 01/26/2024 7.5  5.0 - 8.0 Final    Specific Gravity, UA 01/26/2024 <=1.005  1.005 - 1.030 Final    Glucose, UA 01/26/2024 Negative  Negative Final    Ketones, UA 01/26/2024 Negative  Negative Final    Bilirubin, UA 01/26/2024 Negative  Negative Final    Blood, UA 01/26/2024 Negative  Negative Final    Protein, UA 01/26/2024 Negative   Negative Final    Leuk Esterase, UA 01/26/2024 Negative  Negative Final    Nitrite, UA 01/26/2024 Negative  Negative Final    Urobilinogen, UA 01/26/2024 0.2 E.U./dL  0.2 - 1.0 E.U./dL Final    HCG, Urine QL 01/26/2024 Negative  Negative Final      No radiology results for the last 90 days.    Assessment / Plan      Assessment & Plan:  Diagnoses and all orders for this visit:    1. Dyspepsia (Primary)  -     Stool Culture (Reference Lab) - Stool, Per Rectum; Future  -     Ova & Parasite Examination - Stool, Per Rectum  -     Allergens (52) Food; Future  -     Celiac Comprehensive Panel  -     Alpha-Gal IgE Panel; Future  -     Clostridioides difficile Toxin, PCR - Stool, Per Rectum; Future  -     Calprotectin, Fecal - Stool, Per Rectum; Future  -     Inflammatory Bowel Disease Panel; Future    2. Periumbilical abdominal pain  -     Stool Culture (Reference Lab) - Stool, Per Rectum; Future  -     Ova & Parasite Examination - Stool, Per Rectum  -     Allergens (52) Food; Future  -     Celiac Comprehensive Panel  -     Alpha-Gal IgE Panel; Future  -     Clostridioides difficile Toxin, PCR - Stool, Per Rectum; Future  -     Calprotectin, Fecal - Stool, Per Rectum; Future  -     Inflammatory Bowel Disease Panel; Future    3. Weight loss      Trial of probiotics.  See HPI above.  Has had quite extensive testing in the past that has thus far been negative.    Follow Up:   Return in about 6 months (around 4/23/2025).    Jack Figueroa MD  [facility]    10/23/2024  15:36 EDT    Part of this note may be an electronic transcription/translation of spoken language to printed text using the Dragon Dictation System.

## 2024-10-25 LAB
ENDOMYSIUM IGA SER QL: NEGATIVE
GLIADIN PEPTIDE IGA SER-ACNC: 6 UNITS (ref 0–19)
GLIADIN PEPTIDE IGG SER-ACNC: 3 UNITS (ref 0–19)
IGA SERPL-MCNC: 133 MG/DL (ref 87–352)
TTG IGA SER-ACNC: <2 U/ML (ref 0–3)
TTG IGG SER-ACNC: <2 U/ML (ref 0–5)

## 2024-10-29 LAB
BAKER'S YEAST IGA QN: <20 UNITS (ref 0–24.9)
BAKER'S YEAST IGG QN: <20 UNITS (ref 0–24.9)
P-ANCA ATYPICAL TITR SER IF: NORMAL TITER

## 2024-10-30 LAB
ALPHA-GAL IGE QN: <0.1 KU/L
BEEF IGE QN: <0.1 KU/L
CONV CLASS DESCRIPTION: NORMAL
IGE SERPL-ACNC: 8 IU/ML (ref 6–495)
LAMB IGE QN: <0.1 KU/L
PORK IGE QN: <0.1 KU/L

## 2024-10-31 ENCOUNTER — TELEPHONE (OUTPATIENT)
Dept: GASTROENTEROLOGY | Facility: CLINIC | Age: 24
End: 2024-10-31
Payer: COMMERCIAL

## 2024-11-01 LAB
A-LACTALB IGE QN: <0.1 KU/L
ALMOND IGE QN: <0.1 KU/L
APPLE IGE QN: <0.1 KU/L
AVOCADO IGE QN: <0.1 KU/L
BAKER'S YEAST IGE QN: <0.1 KU/L
BANANA IGE QN: <0.1 KU/L
BEEF IGE QN: <0.1 KU/L
BLACK PEPPER IGE QN: <0.1 KU/L
BROCCOLI IGE QN: <0.1 KU/L
CABBAGE IGE QN: <0.1 KU/L
CARROT IGE QN: <0.1 KU/L
CASHEW NUT IGE QN: <0.1 KU/L
CHEESE MOLD IGE QN: <0.1 KU/L
CHICKEN MEAT IGE QN: <0.1 KU/L
CHILI PEPPER IGE QN: NORMAL KU/L
COCOA IGE QN: <0.1 KU/L
COFFEE IGE QN: <0.1 KU/L
CONV CLASS DESCRIPTION: NORMAL
CORN IGE QN: <0.1 KU/L
COW MILK IGE QN: <0.1 KU/L
CRAB IGE QN: <0.1 KU/L
EGG WHITE IGE QN: <0.1 KU/L
EGG YOLK IGE QN: <0.1 KU/L
GARLIC IGE QN: <0.1 KU/L
GRAPE IGE QN: <0.1 KU/L
GREEN BEAN IGE QN: <0.1 KU/L
HADDOCK IGE QN: <0.1 KU/L
HALIBUT IGE QN: <0.1 KU/L
HAZELNUT IGE QN: <0.1 KU/L
LAMB IGE QN: <0.1 KU/L
LEMON IGE QN: <0.1 KU/L
MELON IGE QN: <0.1 KU/L
MUSHROOM IGE QN: <0.1 KU/L
OAT IGE QN: <0.1 KU/L
ONION IGE QN: <0.1 KU/L
ORANGE IGE QN: <0.1 KU/L
OYSTER IGE QN: <0.1 KU/L
PEANUT IGE QN: <0.1 KU/L
PORK IGE QN: <0.1 KU/L
POTATO IGE QN: <0.1 KU/L
RICE IGE QN: <0.1 KU/L
RYE IGE QN: <0.1 KU/L
SALMON IGE QN: <0.1 KU/L
SHRIMP IGE QN: <0.1 KU/L
SOYBEAN IGE QN: <0.1 KU/L
STRAWBERRY IGE QN: <0.1 KU/L
TEA IGE QN: <0.1 KU/L
TOMATO IGE QN: <0.1 KU/L
TUNA IGE QN: <0.1 KU/L
TURKEY MEAT IGE QN: <0.1 KU/L
VANILLA IGE QN: <0.1 KU/L
WHEAT IGE QN: <0.1 KU/L
WHOLE EGG IGE QN: <0.1 KU/L

## 2024-11-29 DIAGNOSIS — A04.72 C. DIFFICILE DIARRHEA: Primary | ICD-10-CM

## 2024-11-29 LAB — C DIFF TOX GENS STL QL NAA+PROBE: DETECTED

## 2024-11-29 PROCEDURE — 87046 STOOL CULTR AEROBIC BACT EA: CPT

## 2024-11-29 PROCEDURE — 87209 SMEAR COMPLEX STAIN: CPT | Performed by: INTERNAL MEDICINE

## 2024-11-29 PROCEDURE — 87045 FECES CULTURE AEROBIC BACT: CPT

## 2024-11-29 PROCEDURE — 83993 ASSAY FOR CALPROTECTIN FECAL: CPT

## 2024-11-29 PROCEDURE — 87493 C DIFF AMPLIFIED PROBE: CPT

## 2024-11-29 PROCEDURE — 87177 OVA AND PARASITES SMEARS: CPT | Performed by: INTERNAL MEDICINE

## 2024-11-29 PROCEDURE — 87427 SHIGA-LIKE TOXIN AG IA: CPT

## 2024-11-29 RX ORDER — VANCOMYCIN HYDROCHLORIDE 125 MG/1
125 CAPSULE ORAL 4 TIMES DAILY
Qty: 56 CAPSULE | Refills: 0 | Status: SHIPPED | OUTPATIENT
Start: 2024-11-29 | End: 2024-12-13

## 2024-12-02 ENCOUNTER — TELEPHONE (OUTPATIENT)
Dept: GASTROENTEROLOGY | Facility: CLINIC | Age: 24
End: 2024-12-02
Payer: COMMERCIAL

## 2024-12-03 LAB
CALPROTECTIN STL-MCNT: 11 UG/G (ref 0–120)
O+P SPEC MICRO: NORMAL
O+P STL CONC: NORMAL

## 2024-12-03 NOTE — TELEPHONE ENCOUNTER
MS MARSHALL CALLED THIS MORNING ASKING ABOUT RETURNING TO WORK. HUB SPOKE WITH JOSE AND SHE ADVISED IF PATIENT IS STILL SYMPTOMATIC THAT SHE SHOULD NOT RETURN. 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 MS. MARSHALL NEEDS A NOT FAXED TO HER OFFICE EXPLAINING WHY SHE IS MISSING WORK AND WHEN SHE CAN RETURN TO WORK. PLEASE FAX [phone].     IF ANY QUESTIONS PLEASE CONTACT MS. MARSHALL. OK TO CALL BACK ANYTIME. OK TO LEAVE VM

## 2024-12-04 LAB
BACTERIA SPEC CULT: NORMAL
BACTERIA SPEC CULT: NORMAL
CAMPYLOBACTER STL CULT: NORMAL
E COLI SXT STL QL IA: NEGATIVE
SALM + SHIG STL CULT: NORMAL

## 2024-12-18 ENCOUNTER — TELEPHONE (OUTPATIENT)
Dept: GASTROENTEROLOGY | Facility: CLINIC | Age: 24
End: 2024-12-18
Payer: COMMERCIAL

## 2024-12-18 NOTE — TELEPHONE ENCOUNTER
"Pt called and stated that she has taken c dif treatment as directed and is still experiencing some symptoms and wants to know if she should be concerned. She states that ab pain has diminished greatly, however she has 2-3 bms per day now and when she sits down, yellow mucous begins to leak out. She can not \"hold it in.\"  "

## 2025-02-19 ENCOUNTER — TELEMEDICINE (OUTPATIENT)
Dept: GASTROENTEROLOGY | Facility: CLINIC | Age: 25
End: 2025-02-19
Payer: COMMERCIAL

## 2025-02-19 DIAGNOSIS — A04.72 C. DIFFICILE DIARRHEA: Primary | ICD-10-CM

## 2025-02-19 PROCEDURE — 99213 OFFICE O/P EST LOW 20 MIN: CPT | Performed by: INTERNAL MEDICINE

## 2025-02-21 NOTE — PROGRESS NOTES
Follow Up Note     Date: 2025   Patient Name: Raza Pham  MRN: [id number]  : 2000     Referring Physician: Lori Cao APRN    Chief Complaint: Follow-up    Interval History:   2025  Raza Pham is a 24 y.o. female who is here today for follow up for a follow-up.    She notes that she did well initially after the antibiotic therapy, but she feels that she is now starting to become symptomatic again.      Subjective      Past Medical History:   Diagnosis Date    Anxiety     Asthma     Depression     Migraine      Past Surgical History:   Procedure Laterality Date    WISDOM TOOTH EXTRACTION       Family History   Problem Relation Age of Onset    Diabetes Maternal Grandfather     Breast cancer Maternal Grandmother     Diabetes Maternal Grandmother      Social History     Socioeconomic History    Marital status: Single   Tobacco Use    Smoking status: Former     Current packs/day: 0.50     Average packs/day: 0.5 packs/day for 2.0 years (1.0 ttl pk-yrs)     Types: Cigarettes    Tobacco comments:     I currently vape daily   Vaping Use    Vaping status: Never Used   Substance and Sexual Activity    Alcohol use: Never    Drug use: Never    Sexual activity: Yes     Partners: Male     Birth control/protection: Patch       Current Outpatient Medications:     cholecalciferol (VITAMIN D3) 1.25 MG (81228 UT) capsule, , Disp: , Rfl:     levocetirizine (XYZAL) 5 MG tablet, Take 1 tablet by mouth Every Evening., Disp: , Rfl:     sertraline (ZOLOFT) 50 MG tablet, Take 1 tablet by mouth Daily., Disp: , Rfl:     Xulane 150-35 MCG/24HR, APPLY 1 PATCH TOPICALLY TO THE SKIN 1 TIME A WEEK, Disp: , Rfl:   No Known Allergies  Review of Systems  Abdominal pain, loose stool    The following portions of the patient's history were reviewed and updated as appropriate: allergies, current medications, past family history, past medical history, past social history, past surgical history and problem list.    Objective      Physical Exam:  There were no vitals filed for this visit.    Physical Exam  Constitutional:       General: She is not in acute distress.  HENT:      Head: Normocephalic.   Eyes:      General: No scleral icterus.     Conjunctiva/sclera: Conjunctivae normal.   Pulmonary:      Effort: No respiratory distress.   Psychiatric:         Mood and Affect: Mood normal.         Results Review:   I reviewed the patient's new clinical results.    No visits with results within 90 Day(s) from this visit.   Latest known visit with results is:   Lab on 10/23/2024   Component Date Value Ref Range Status    Class Description 10/23/2024 Comment   Final        Levels of Specific IgE       Class  Description of Class      ---------------------------  -----  --------------------                     < 0.10         0         Negative             0.10 -    0.31         0/I       Equivocal/Low             0.32 -    0.55         I         Low             0.56 -    1.40         II        Moderate             1.41 -    3.90         III       High             3.91 -   19.00         IV        Very High            19.01 -  100.00         V         Very High                    >100.00         VI        Very High    Egg White 10/23/2024 <0.10  Class 0 kU/L Final    Milk, Cow's 10/23/2024 <0.10  Class 0 kU/L Final    Wheat 10/23/2024 <0.10  Class 0 kU/L Final    [place] 10/23/2024 <0.10  Class 0 kU/L Final    Oats 10/23/2024 <0.10  Class 0 kU/L Final    [place] 10/23/2024 <0.10  Class 0 kU/L Final    Rice 10/23/2024 <0.10  Class 0 kU/L Final    Peanut 10/23/2024 <0.10  Class 0 kU/L Final    Soybean 10/23/2024 <0.10  Class 0 kU/L Final    Hazelnut 10/23/2024 <0.10  Class 0 kU/L Final    Almonds 10/23/2024 <0.10  Class 0 kU/L Final    Crab 10/23/2024 <0.10  Class 0 kU/L Final    Shrimp 10/23/2024 <0.10  Class 0 kU/L Final    Tomato 10/23/2024 <0.10  Class 0 kU/L Final    Pork 10/23/2024 <0.10  Class 0 kU/L Final    Beef 10/23/2024 <0.10  Class 0 kU/L Final     Carrot 10/23/2024 <0.10  Class 0 kU/L Final    [place] 10/23/2024 <0.10  Class 0 kU/L Final    Potato 10/23/2024 <0.10  Class 0 kU/L Final    Tuna 10/23/2024 <0.10  Class 0 kU/L Final    [place] 10/23/2024 <0.10  Class 0 kU/L Final    [place] 10/23/2024 <0.10  Class 0 kU/L Final    Two Dot 10/23/2024 <0.10  Class 0 kU/L Final    Baker's Yeast 10/23/2024 <0.10  Class 0 kU/L Final    Garlic 10/23/2024 <0.10  Class 0 kU/L Final    Onion 10/23/2024 <0.10  Class 0 kU/L Final    Apple 10/23/2024 <0.10  Class 0 kU/L Final    Chocolate 10/23/2024 <0.10  Class 0 kU/L Final    Egg Yolk 10/23/2024 <0.10  Class 0 kU/L Final    Alpha Lactalbumin 10/23/2024 <0.10  Class 0 kU/L Final    Mold Cheese 10/23/2024 <0.10  Class 0 kU/L Final    Chicken 10/23/2024 <0.10  Class 0 kU/L Final    Lamb 10/23/2024 <0.10  Class 0 kU/L Final    Banana 10/23/2024 <0.10  Class 0 kU/L Final    Avocado 10/23/2024 <0.10  Class 0 kU/L Final    Melon 10/23/2024 <0.10  Class 0 kU/L Final    Cashew 10/23/2024 <0.10  Class 0 kU/L Final    Lemon 10/23/2024 <0.10  Class 0 kU/L Final    Mushroom 10/23/2024 <0.10  Class 0 kU/L Final    Cabbage 10/23/2024 <0.10  Class 0 kU/L Final    Coffee 10/23/2024 <0.10  Class 0 kU/L Final    Tea 10/23/2024 <0.10  Class 0 kU/L Final    Vanilla 10/23/2024 <0.10  Class 0 kU/L Final    Egg 10/23/2024 <0.10  Class 0 kU/L Final    Grape 10/23/2024 <0.10  Class 0 kU/L Final    Broccoli 10/23/2024 <0.10  Class 0 kU/L Final    Chili Pepper 10/23/2024 TNP  kU/L Final    Test not performed. Unable to perform test due to current  unavailability of reagents or discontinuation of test.    Black Pepper 10/23/2024 <0.10  Class 0 kU/L Final    Turkey 10/23/2024 <0.10  Class 0 kU/L Final    Oyster 10/23/2024 <0.10  Class 0 kU/L Final    Halibut 10/23/2024 <0.10  Class 0 kU/L Final    Green Bean 10/23/2024 <0.10  Class 0 kU/L Final    Class Description 10/23/2024 Comment   Final        Levels of Specific IgE       Class  Description of  Class      ---------------------------  -----  --------------------                     < 0.10         0         Negative             0.10 -    0.31         0/I       Equivocal/Low             0.32 -    0.55         I         Low             0.56 -    1.40         II        Moderate             1.41 -    3.90         III       High             3.91 -   19.00         IV        Very High            19.01 -  100.00         V         Very High                    >100.00         VI        Very High    IgE 10/23/2024 8  6 - 495 IU/mL Final    Pork 10/23/2024 <0.10  Class 0 kU/L Final    Beef 10/23/2024 <0.10  Class 0 kU/L Final    Lamb 10/23/2024 <0.10  Class 0 kU/L Final    [id number] Alpha-Gal 10/23/2024 <0.10  Class 0 kU/L Final    Saccharomyces cerevisiae Ab IgG 10/23/2024 <20.0  0.0 - 24.9 Units Final                                    Negative         <20.0                                  Equivocal  20.1 - 24.9                                  Positive     >or= 25.0    Saccharomyces cerevisiae Ab IgA 10/23/2024 <20.0  0.0 - 24.9 Units Final                                     Negative        <20.0                                   Equivocal 20.1 - 24.9                                   Positive    >or= 25.0  IgA and IgG antibody testing for S. cerevisiae is  useful adjunct testing for differentiating Crohn's  disease and ulcerative colitis. Close to 80% of  Crohn's disease patients are positive for either  IgA or IgG. In ulcerative colitis, less than 15% are  positive for IgG and less than 2% are positive for  IgA. Fewer than 5% are positive for either IgG or  IgA antibody, and no healthy controls had antibody  for both.    Atypical pANCA 10/23/2024 <1:20  Neg:<1:20 titer Final    The atypical pANCA pattern has been observed in a significant  percentage of patients with ulcerative colitis, primary sclerosing  cholangitis and autoimmune hepatitis.            ASCA+/PANCA- Suggestive of Crohn's disease             ASCA-/PANCA+ Suggestive of Ulcerative colitis    Salmonella/Shigella Screen 11/29/2024 Final report   Final    Result 1 11/29/2024 Comment   Final    No Salmonella or Shigella recovered.    Campylobacter Culture 11/29/2024 Final report   Final    Result 1 11/29/2024 Comment   Final    No Campylobacter species isolated.    E coli, Shiga toxin Assay 11/29/2024 Negative  Negative Final    Toxigenic C. difficile by PCR 11/29/2024 Detected (A)  Not Detected Final    Calprotectin, Fecal 11/29/2024 11  0 - 120 ug/g Final    Concentration     Interpretation   Follow-Up  < 5 - 50 ug/g     Normal           None  >50 -120 ug/g     Borderline       Re-evaluate in 4-6 weeks      >120 ug/g     Abnormal         Repeat as clinically                                     indicated      No radiology results for the last 90 days.    Assessment / Plan      Diagnoses and all orders for this visit:    1. C. difficile diarrhea (Primary)  -     Clostridioides difficile Toxin, PCR - Stool, Per Rectum; Future         Recheck C. difficile test as above.  If it is still positive, will consider either Dificid or a taper of oral vancomycin.    This was a telehealth visit.  The patient was located at her residence, and I was located in the office.    Follow Up:   Return if symptoms worsen or fail to improve.    Jack Figueroa MD  [facility]  2/21/2025  09:57 EST     Part of this note may be an electronic transcription/translation of spoken language to printed text using the Dragon Dictation System.

## 2025-03-07 ENCOUNTER — TELEPHONE (OUTPATIENT)
Dept: GASTROENTEROLOGY | Facility: CLINIC | Age: 25
End: 2025-03-07
Payer: COMMERCIAL

## 2025-03-07 ENCOUNTER — HOSPITAL ENCOUNTER (OUTPATIENT)
Facility: HOSPITAL | Age: 25
Discharge: HOME | End: 2025-03-07

## 2025-03-07 ENCOUNTER — HOSPITAL ENCOUNTER (EMERGENCY)
Facility: HOSPITAL | Age: 25
Discharge: HOME | End: 2025-03-07
Attending: EMERGENCY MEDICINE
Payer: MEDICAID

## 2025-03-07 VITALS
RESPIRATION RATE: 18 BRPM | SYSTOLIC BLOOD PRESSURE: 114 MMHG | HEART RATE: 77 BPM | DIASTOLIC BLOOD PRESSURE: 56 MMHG | TEMPERATURE: 98.2 F | OXYGEN SATURATION: 100 %

## 2025-03-07 DIAGNOSIS — R19.7 DIARRHEA OF PRESUMED INFECTIOUS ORIGIN: Primary | ICD-10-CM

## 2025-03-07 PROCEDURE — 99283 EMERGENCY DEPT VISIT LOW MDM: CPT

## 2025-03-07 PROCEDURE — 6370000000 HC RX 637 (ALT 250 FOR IP): Performed by: EMERGENCY MEDICINE

## 2025-03-07 RX ORDER — ONDANSETRON 4 MG/1
4 TABLET, ORALLY DISINTEGRATING ORAL
COMMUNITY
Start: 2024-03-13

## 2025-03-07 RX ORDER — DIPHENOXYLATE HYDROCHLORIDE AND ATROPINE SULFATE 2.5; .025 MG/1; MG/1
1 TABLET ORAL 4 TIMES DAILY PRN
Qty: 20 TABLET | Refills: 0 | Status: SHIPPED | OUTPATIENT
Start: 2025-03-07 | End: 2025-03-17

## 2025-03-07 RX ORDER — DIPHENOXYLATE HYDROCHLORIDE AND ATROPINE SULFATE 2.5; .025 MG/1; MG/1
1 TABLET ORAL ONCE
Status: COMPLETED | OUTPATIENT
Start: 2025-03-07 | End: 2025-03-07

## 2025-03-07 RX ADMIN — DIPHENOXYLATE HYDROCHLORIDE AND ATROPINE SULFATE 1 TABLET: 2.5; .025 TABLET ORAL at 13:12

## 2025-03-07 NOTE — TELEPHONE ENCOUNTER
Pt called and stated that she has recently began to experience severe and frequent diarrhea for the last day or so. Requesting advice.  Told her to ensure that she stays hydrated and to report to ED if symptoms increase or persist.

## 2025-03-07 NOTE — ED TRIAGE NOTES
Pt reports that she started with abdominal cramping, with unknown cause, had unknown cause, had scope, with no conclusive results, started having mucus diarrhea, had stool work up, diagnosed with C-Diff.   Completed round of Vanc in December(3 months ago).   Pt report since then she has had intermittent episodes of diarrhea, over last week has had increase symptoms again, contacted GI, another stool study was ordered, results pending.   Reports today has started having bowel incontinence, reports is frequent oozing.

## 2025-03-07 NOTE — ED PROVIDER NOTES
[facility]  EMERGENCY DEPARTMENT ENCOUNTER        Pt Name: Rj Morales  MRN: [id number]  Birthdate 2000  Date of evaluation: 3/7/2025  Provider: Bola Dockery DO  PCP: Latisha Moser APRN - CNP  Note Started: 1:14 PM EST 3/7/25    CHIEF COMPLAINT       Chief Complaint   Patient presents with    Incontinence     Bowel         HISTORY OF PRESENT ILLNESS:     History from : Patient    Limitations to history : None    Rj Morales is a 24 y.o. female who presents with fecal incontinence.  Patient with history of C. difficile.  She was treated for this few months ago.  Completed oral vancomycin.  Has been having intermittent cramping and diarrhea.  Is followed by GI.  Had a repeat stool sample distraught today due to some recent incontinence and watery stool.  She would like something for diarrhea.  She tried Imodium without any relief.  Had some cramping earlier.  Denies blood in her stool fever chills nausea or vomiting.    Nursing Notes were all reviewed and agreed with or any disagreements were addressed in the HPI.    REVIEW OF SYSTEMS :      10 systems reviewed and negative except as in HPI/MDM    PAST MEDICAL HISTORY     Past Medical History:   Diagnosis Date    Anxiety     Clostridium difficile diarrhea     Depression        SURGICAL HISTORY     Past Surgical History:   Procedure Laterality Date    COLONOSCOPY  05/2024    ESOPHAGOGASTRODUODENOSCOPY  05/2024    MOUTH SURGERY         CURRENTMEDICATIONS       Discharge Medication List as of 3/7/2025  1:04 PM        CONTINUE these medications which have NOT CHANGED    Details   ondansetron (ZOFRAN-ODT) 4 MG disintegrating tablet Take 1 tablet by mouthHistorical Med      sertraline (ZOLOFT) 50 MG tablet Take 1 tablet by mouth dailyHistorical Med      norelgestromin-ethinyl estradiol (XULANE) 150-35 MCG/24HR Place 1 patch onto the skin once a weekHistorical Med      vitamin D (CHOLECALCIFEROL) 58600  0972)            Is this patient to be included in the SEP-1 Core Measure due to severe sepsis or septic shock?       MDM      has a past medical history of Anxiety, Clostridium difficile diarrhea, and Depression.     MDM  Number of Diagnoses or Management Options  Diarrhea of presumed infectious origin  Diagnosis management comments: 24-year-old female presents the emergency department with diarrhea.  May be due to CDF, IBS or other infectious etiologies.  Will start Lomotil.  She had a stool sample obtained prior to arrival.  Is going to be followed up by GI for this.                 CONSULTS: (Who and What was discussed)  None    Discussion with Other Profesionals : None              Disposition Considerations (include 1 Tests not done, Shared Decision Making, Pt Expectation of Test or Tx.):         I am the Primary Clinician of Record.    FINAL IMPRESSION      1. Diarrhea of presumed infectious origin          DISPOSITION/PLAN     DISPOSITION Decision To Discharge 03/07/2025 01:03:04 PM   DISPOSITION CONDITION Stable           PATIENT REFERRED TO:  No follow-up provider specified.    DISCHARGE MEDICATIONS:  Discharge Medication List as of 3/7/2025  1:04 PM        START taking these medications    Details   diphenoxylate-atropine (LOMOTIL) 2.5-0.025 MG per tablet Take 1 tablet by mouth 4 times daily as needed for Diarrhea for up to 10 days. Max Daily Amount: 4 tablets, Disp-20 tablet, R-0Normal             DISCONTINUED MEDICATIONS:  Discharge Medication List as of 3/7/2025  1:04 PM        STOP taking these medications       ketorolac (TORADOL) 10 MG tablet Comments:   Reason for Stopping:                      (Please note that portions of this note were completed with a voice recognition program.  Efforts were made to edit the dictations but occasionally words are mis-transcribed.)    Bola Dockery DO (electronically signed)           Bola Dockery DO  03/07/25 0203

## 2025-03-07 NOTE — DISCHARGE INSTRUCTIONS
Use Lomotil for your diarrhea.  Follow-up with GI and get antibiotics if your C. difficile comes back positive.   Left Deviation

## 2025-03-10 ENCOUNTER — TELEPHONE (OUTPATIENT)
Dept: GASTROENTEROLOGY | Facility: CLINIC | Age: 25
End: 2025-03-10
Payer: COMMERCIAL

## 2025-03-10 DIAGNOSIS — R19.7 DIARRHEA, UNSPECIFIED TYPE: Primary | ICD-10-CM

## 2025-03-10 DIAGNOSIS — Z86.19 HISTORY OF CLOSTRIDIUM DIFFICILE INFECTION: ICD-10-CM

## 2025-03-10 NOTE — TELEPHONE ENCOUNTER
Caller: Raza Pham    Relationship to patient: Self    Best call back number: [phone]     Patient is needing: PATIENT CALLED TO SPEAK WITH JYOTI ABOUT A STOOL SAMPLE KIT. SHE HAD ONE AND COMPLETED IT. SHE TOOK IT TO THE HOSPITAL NEAR HER BUT THEY TOLD HER IT WAS TOO FORMED AND CANCELED HER ORDER. SHE STATED SHE WILL NEED ANOTHER KIT AND WILL BE BRINGING IT TO US DESPITE THE DISTANCE. HOWEVER, IF THE KIT CAN BE PICKED UP AT THE HOSPITAL NEAR HER THAT WOULD BE PREFERRED.     PLEASE CALL TO ADVISE.

## 2025-05-28 ENCOUNTER — HOSPITAL ENCOUNTER (OUTPATIENT)
Facility: HOSPITAL | Age: 25
Discharge: HOME | End: 2025-05-28
Payer: MEDICAID

## 2025-05-28 ENCOUNTER — HOSPITAL ENCOUNTER (OUTPATIENT)
Dept: GENERAL RADIOLOGY | Facility: HOSPITAL | Age: 25
Discharge: HOME | End: 2025-05-28
Payer: MEDICAID

## 2025-05-28 DIAGNOSIS — M54.51 VERTEBROGENIC LOW BACK PAIN: ICD-10-CM

## 2025-05-28 DIAGNOSIS — M25.552 LEFT HIP PAIN: ICD-10-CM

## 2025-05-28 DIAGNOSIS — M25.551 RIGHT HIP PAIN: ICD-10-CM

## 2025-05-28 PROCEDURE — 73521 X-RAY EXAM HIPS BI 2 VIEWS: CPT

## 2025-05-28 PROCEDURE — 72100 X-RAY EXAM L-S SPINE 2/3 VWS: CPT

## 2025-06-09 ENCOUNTER — TRANSCRIBE ORDERS (OUTPATIENT)
Dept: GENERAL RADIOLOGY | Facility: HOSPITAL | Age: 25
End: 2025-06-09

## 2025-06-09 DIAGNOSIS — M25.551 HIP PAIN, RIGHT: ICD-10-CM

## 2025-06-09 DIAGNOSIS — M25.552 HIP PAIN, LEFT: Primary | ICD-10-CM

## 2025-06-09 DIAGNOSIS — M54.51 VERTEBROGENIC LOW BACK PAIN: Primary | ICD-10-CM
